# Patient Record
Sex: FEMALE | NOT HISPANIC OR LATINO | URBAN - METROPOLITAN AREA
[De-identification: names, ages, dates, MRNs, and addresses within clinical notes are randomized per-mention and may not be internally consistent; named-entity substitution may affect disease eponyms.]

---

## 2020-04-02 ENCOUNTER — APPOINTMENT (RX ONLY)
Dept: URBAN - METROPOLITAN AREA CLINIC 295 | Facility: CLINIC | Age: 55
Setting detail: DERMATOLOGY
End: 2020-04-02

## 2020-04-02 DIAGNOSIS — L20.89 OTHER ATOPIC DERMATITIS: ICD-10-CM

## 2020-04-02 PROBLEM — L20.84 INTRINSIC (ALLERGIC) ECZEMA: Status: ACTIVE | Noted: 2020-04-02

## 2020-04-02 PROCEDURE — 99202 OFFICE O/P NEW SF 15 MIN: CPT | Mod: 95

## 2020-04-02 PROCEDURE — ? PRESCRIPTION

## 2020-04-02 PROCEDURE — ? ADDITIONAL NOTES

## 2020-04-02 PROCEDURE — ? COUNSELING

## 2020-04-02 RX ORDER — TRIAMCINOLONE ACETONIDE 1 MG/G
CREAM TOPICAL
Qty: 1 | Refills: 2 | Status: ERX | COMMUNITY
Start: 2020-04-02

## 2020-04-02 RX ADMIN — TRIAMCINOLONE ACETONIDE: 1 CREAM TOPICAL at 00:00

## 2020-04-02 ASSESSMENT — LOCATION DETAILED DESCRIPTION DERM: LOCATION DETAILED: RIGHT INFERIOR ANTERIOR NECK

## 2020-04-02 ASSESSMENT — LOCATION ZONE DERM: LOCATION ZONE: NECK

## 2020-04-02 ASSESSMENT — BSA RASH: BSA RASH: 2

## 2020-04-02 ASSESSMENT — LOCATION SIMPLE DESCRIPTION DERM: LOCATION SIMPLE: RIGHT ANTERIOR NECK

## 2020-04-02 NOTE — HPI: RASH (ECZEMA)
How Severe Is Your Eczema?: severe
Is This A New Presentation, Or A Follow-Up?: Rash
Additional History: NP reports rash on and off x 12-18 mos. hx signif joint pain but rheum says no evid SLE or RA. declines NSAIDS due to covid19 risk.  currently takes tumeric

## 2020-04-02 NOTE — PROCEDURE: ADDITIONAL NOTES
Additional Notes: i told pt this could represent a yeast or candida process as well because of the .linearity and skin fold distribution but she agreed to try the topical steroid first x 7 d and if no improvement , she will call ad we will discuss other options
Detail Level: Simple
Additional Notes: After discussion of the risks, benefits and limitations with respect to this Telehealth visit, including potential limitations in picture and video quality, the patient has given verbal consent to proceed with this Telehealth visit. Interactive audio and video telecommunications were used to permit real-time communication between myself and the patient.  This Telehealth visit was performed due to the national COVID-19 emergency and recommended social distancing.